# Patient Record
Sex: FEMALE | Race: WHITE
[De-identification: names, ages, dates, MRNs, and addresses within clinical notes are randomized per-mention and may not be internally consistent; named-entity substitution may affect disease eponyms.]

---

## 2018-12-07 ENCOUNTER — HOSPITAL ENCOUNTER (OUTPATIENT)
Dept: HOSPITAL 97 - OR | Age: 73
Discharge: HOME | End: 2018-12-07
Attending: SURGERY
Payer: COMMERCIAL

## 2018-12-07 DIAGNOSIS — G25.81: ICD-10-CM

## 2018-12-07 DIAGNOSIS — M19.90: ICD-10-CM

## 2018-12-07 DIAGNOSIS — G89.29: ICD-10-CM

## 2018-12-07 DIAGNOSIS — K64.8: ICD-10-CM

## 2018-12-07 DIAGNOSIS — Z79.51: ICD-10-CM

## 2018-12-07 DIAGNOSIS — Z79.1: ICD-10-CM

## 2018-12-07 DIAGNOSIS — J44.9: ICD-10-CM

## 2018-12-07 DIAGNOSIS — I10: ICD-10-CM

## 2018-12-07 DIAGNOSIS — K57.30: ICD-10-CM

## 2018-12-07 DIAGNOSIS — Z86.010: ICD-10-CM

## 2018-12-07 DIAGNOSIS — K64.5: ICD-10-CM

## 2018-12-07 DIAGNOSIS — Z12.11: Primary | ICD-10-CM

## 2018-12-07 DIAGNOSIS — Z79.82: ICD-10-CM

## 2018-12-07 DIAGNOSIS — Z79.899: ICD-10-CM

## 2018-12-07 DIAGNOSIS — M54.12: ICD-10-CM

## 2018-12-07 PROCEDURE — 0DJD8ZZ INSPECTION OF LOWER INTESTINAL TRACT, VIA NATURAL OR ARTIFICIAL OPENING ENDOSCOPIC: ICD-10-PCS

## 2018-12-07 NOTE — XMS REPORT
Continuity of Care Document

 Created on:2018



Patient:CARRIE GOLDBERG

Sex:Female

:1945

External Reference #:[id number]





Demographics







 Address  [address] [place] [address], [place] [address]

 

 Phone  [phone]

 

 Preferred Language  Unknown

 

 Marital Status  Unknown

 

 [employer] Affiliation  Unknown

 

 Race  Unknown

 

 Ethnic Group  Unknown









Author







 Organization  [facility]









Problems







 Problem  Status  Onset Date  Classification  Date  Comments  Source



         Reported    



             



             







Medications







 Medication  Details  Route  Status  Patient  Ordering  Order  Source



         Instructions  Provider  Date  



               



               



               







Allergies, Adverse Reactions, Alerts







 Substance  Category  Reaction  Severity  Reaction  Status  Date  Comments  
Source



         type    Reported    



                 



                 







Immunizations







 Immunization  Date Given  Site  Status  Last Updated  Comments  Source



             



             







Results







 Order  Results  Value  Reference  Date  Interpretation  Comments  Source



 Name      Range        



               



               







Vital Signs







 Vital Sign  Value  Date  Comments  Source



         







Encounters







 Location  Location  Encounter  Encounter  Reason  Attending  ADM  DC  Status  
Source



   Details  Type  Number  For  Provider  Date  Date    



         Visit          



                   



                   



                   

 

     Outpatient  [id number]    MIGUEL      [facility]  /[address] [place] [address]



                   



                   



                   



                   

 

     Outpatient  [id number]    [facility]      [address]



                   



                   



                   



                   







Procedures







 Procedure  Code  Date  Perfomer  Comments  Source

## 2018-12-07 NOTE — ENDO RPT
[address], [address]





COLONOSCOPY PROCEDURE REPORT     EXAM DATE: 12/07/2018



PATIENT NAME:      Patricia Mcdonald           MR #:      [id number]

YOB: 1945      VISIT #:     [id number]

ATTENDING:     Govind Michel DR     STATUS:     outpatient

ASSISTANT:      Sandra Elizabeth RN, Gloria Montelongo, and Skip Montelongo



INDICATIONS:  The patient is a 73 yr old Female here for a colonoscopy due to

colon cancer screening

PROCEDURE PERFORMED:     Screening Colonoscopy and Colonoscopy

MEDICATIONS:     Per Anesthesia.

ESTIMATED BLOOD LOSS:     None



CONSENT: The patient understands the risks and benefits of the procedure and

understands that these risks include, but are not limited to: sedation,

allergic reaction, infection, perforation and/or bleeding. Alternative means of

evaluation and treatment include, among others: physical exam, x-rays, and/or

surgical intervention. The patient elects to proceed with this endoscopic

procedure.



DESCRIPTION OF PROCEDURE:  During intra-op preparation period all mechanical 

medical equipment was checked for proper function. Hand hygiene and appropriate

measures for infection prevention was taken.  Procedure, possible

complications, alternatives including, but not limited to possibility of

bleeding, perforation, tear, infection, sepsis, need for surgery, need for

blood transfusion, were explained to the patient.  After the risks, benefits

and alternatives of the procedure were thoroughly explained, Informed consent

was verified, confirmed and timeout was successfully executed by the treatment

team.  The patient was placed in the left lateral position.   A digital rectal

exam was performed and revealed internal thrombosed hemorrhoids.  After

appropriate level of anesthesia, the scope was passed.  The EC-[id number] ([id number])

endoscope was introduced through the anus and advanced to the cecum, which was

identified by both the appendix and ileocecal valve. The quality of the prep

was fair. The instrument was then slowly withdrawn as the colon was fully

examined.  Scope withdrawal time was 8 minutes.



COLON FINDINGS: Mild diverticulosis was noted in the descending colon.  No

bleeding was noted from the diverticulosis.   Small internal hemorrhoids were

found.  Retroflexed views revealed no abnormalities. The scope was then

completely withdrawn from the patient and the procedure terminated.







ADVERSE EVENTS:      There were no complications.

IMPRESSIONS:     1.  Mild diverticulosis was noted in the descending colon

2.  Small internal hemorrhoids



RECOMMENDATIONS:     1.  yearly hemoccult starting in 4 years

2.  hemorrhoidal hygiene

3.  low fiber / diverticular diet

RECALL:     Return in 10 year(s) for Colonoscopy.



_____________________________

Govind Michel DR

eSigned:  Govind Michel DR 12/07/2018 10:00 AM





cc:



CPT CODES:

ICD9 CODES:





PATIENT NAME:  Katie Mcdonaldkenia MEDINA

MR#: [id number]

## 2024-08-14 NOTE — RAD REPORT
EXAM DESCRIPTION:  RADChest Single View8/14/2024 10:24 am

 

CLINICAL HISTORY:  sepsis

 

COMPARISON:  Chest Single View dated 7/26/2024; Chest Pa And Lat (2 Views) dated 2/21/2023; Chest Pa 
And Lat (2 Views) dated 3/29/2018; CHEST PA AND LAT 2 VIEW dated 12/11/2009

 

TECHNIQUE:   Portable AP view of the chest.

 

FINDINGS:  Developing patchy left central basilar airspace opacity. Mild hazy opacification at the ri
ght base, could reflect mild airspace opacification versus artifactual increased density due to super
imposition of soft tissues.  No pneumothorax or effusion. The cardiomediastinal contours are unremark
able.

 

IMPRESSION:  Developing patchy left basilar airspace opacity and questionable mild right basilar airs
pace opacification. Findings raise concern for pneumonia.

## 2024-08-14 NOTE — RAD REPORT
EXAM DESCRIPTION:  CT - Abdomen   Pelvis W Contrast - 8/14/2024 11:38 am

 

CLINICAL HISTORY:  ABD PAIN

 

COMPARISON:  Small Bowel Series dated 9/29/2023

 

TECHNIQUE:  Thin cut axial CT imaging of the abdomen and pelvis was performed following intravenous a
dministration of 100 mL Isovue 300. Multiplanar reformats were generated and reviewed.

 

All CT scans are performed using dose optimization technique as appropriate and may include automated
 exposure control or mA/KV adjustment according to patient size.

 

FINDINGS:  Numerous bibasilar lung nodules, the largest in the left lower lobe anteriorly measuring 1
.2 cm and in the medial right lower lobe, subpleural in location, measuring 11 mm.

 

The liver, spleen, and pancreas show no suspicious findings. Gallbladder and biliary tree are also wi
thout suspicious finding.

 

Hypoenhancement at the left lower renal pole. No subcapsular fluid collections. Bilateral ureteral st
ents are present within mildly dilated renal collecting systems. Evidence of left renal collecting sy
stem duplication with mild hydronephrosis along the lower moiety collecting system as well.

 

No dilated bowel loops or bowel wall thickening. No free air, free fluid or inflammatory stranding. N
o hernia, mass or bulky lymphadenopathy. Bilateral hip arthroplasty hardware in place which limits ev
aluation in the pelvis due to streak artifact. Soft tissue prominence and hypoattenuation in the kate
on of the lower uterine segment and cervix, not well evaluated given streak artifact. Well-circumscri
bed fundal collection measuring 5.1 x 3.1 cm, may relate to a degenerating fibroid. Bladder is modera
tely distended, with mild pericystic fat stranding near the dome. The bladder outlet region is not we
ll evaluated due to metallic streak artifact.

 

No suspicious bony findings.

 

 

IMPRESSION:  Bilateral mild hydroureteronephrosis with ureteral stents in place. 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
 of the left renal collecting system, with hydronephrosis of the non stented lower moiety collecting 
system as well.

 

Region of hypoenhancement at the left lower renal pole, which could relate to collecting system obstr
uction versus ongoing pyelonephritis. Please correlate clinically. Questionable pericystic mild fat s
tranding as well, please correlate clinically for infectious/inflammatory cystitis.

 

Region of soft tissue prominence and hypoattenuation in the lower uterine segment and cervix. This re
gion is not well evaluated given extensive metallic streak artifact resulting from bilateral hip arth
roplasty hardware. Additional evaluation by targeted pelvic ultrasound may be helpful.

 

Numerous bilateral lower lung nodules, up to 1.2 cm in size. In the absence of prior comparisons, a f
ollow-up CT chest in 3-6 months would be recommended to ensure stability, with an additional follow-u
p CT in 18-24 months especially if the patient is high risk for lung cancer, per [employer] clare young.

 

Other incidental findings as above.

## 2024-08-14 NOTE — EDPHYS
Patient is a 89-year-old female with past medical history of hypertension hyperlipidemia hypothyroidism hearing loss asthma not on any blood thinners brought in by niece for generalized weakness and decline for about 1 week.  Family states she lives alone and is usually very independent but for about 2 weeks have noticed a decline.  Patient's neighbor has been helping out with feeding.  Patient is not eating on her own not speaking as much unable to walk due to weakness.  No known trauma or falls family states patient was moaning today. Physician Documentation                                                                           

 [facility]                                                                 

Name: Patricia Mcdonald                                                                           

Age: 78 yrs                                                                                       

Sex: Female                                                                                       

: 1945                                                                                   

MRN: [id number]                                                                                   

Arrival Date: 2024                                                                          

Time: 09:54                                                                                       

Account#: [id number]                                                                            

Bed 15                                                                                            

Private MD:                                                                                       

ED Physician Tomer Herrera                                                                         

HPI:                                                                                              

                                                                                             

11:18 This 78 yrs old Female presents to ER via Ambulatory with complaints of Urinary         sp3 

      Incontinence, Abdominal Pain.                                                               

11:18 78-year-old female with history of kidney disease, hypertension and recent  cancer    sp3 

      that she is being evaluated for regarding chemo and radiation. Patient was at routine       

      appointment at cardiologist office today where she reported tachycardia, urinary            

      frequency and abdominal pain. Dr. Wilkinson evaluated patient and sent her to the ED for       

      further evaluation and probable urosepsis. Patient denies fever, chest pain, shortness      

      of breath, back pain, syncope, near syncope, rash, or any other signs or symptoms on        

      ROS at this time. She does endorse urinary frequency and mild dysuria..                     

                                                                                                  

Historical:                                                                                       

- Allergies:                                                                                      

10:04 No Known Allergies;                                                                     dd2 

- PMHx:                                                                                           

10:04 Kidney disease; Hypertensive disorder;                                                  dd2 

- PSHx:                                                                                           

10:04 Hemorrhoidectomy;                                                                       dd2 

                                                                                                  

- Immunization history:: Adult Immunizations unknown.                                             

- Infectious Disease History:: Denies.                                                            

- Social history:: Smoking status: Patient denies any tobacco usage or history of.                

                                                                                                  

                                                                                                  

ROS:                                                                                              

11:19 Constitutional: Negative for fever, chills, and weight loss, Eyes: Negative for injury, sp3 

      pain, redness, and discharge, Neck: Negative for injury, pain, and swelling,                

      Respiratory: Negative for shortness of breath, cough, wheezing, and pleuritic chest         

      pain, Back: Negative for injury and pain, MS/Extremity: Negative for injury and             

      deformity, Skin: Negative for injury, rash, and discoloration, Neuro: Negative for          

      headache, weakness, numbness, tingling, and seizure, Psych: Negative for depression,        

      anxiety, suicide ideation, homicidal ideation, and hallucinations, Allergy/Immunology:      

      Negative for hives, rash, and allergies, Endocrine: Negative for neck swelling,             

      polydipsia, polyuria, polyphagia, and marked weight changes,                                

11:19 All other systems are negative,                                                             

                                                                                                  

Exam:                                                                                             

11:19 Constitutional:  This is a well developed, well nourished patient who is awake, alert,  sp3 

      and in no acute distress. Head/Face:  Normocephalic, atraumatic. Eyes:  Pupils equal        

      round and reactive to light, extra-ocular motions intact.  Lids and lashes normal.          

      Conjunctiva and sclera are non-icteric and not injected.  Cornea within normal limits.      

      Periorbital areas with no swelling, redness, or edema. ENT:  Nares patent. No nasal         

      discharge, no septal abnormalities noted.  External auditory canals are clear.              

      Oropharynx with no redness, swelling, or masses, exudates, or evidence of obstruction,      

      uvula midline.  Mucous membranes moist. Neck:  Trachea midline, no thyromegaly or           

      masses palpated, and no cervical lymphadenopathy.  Supple, full range of motion without     

      nuchal rigidity, or vertebral point tenderness.  No Meningismus. Chest/axilla:  Normal      

      chest wall appearance and motion.  Nontender with no deformity.  No lesions are             

      appreciated. Respiratory:  Lungs have equal breath sounds bilaterally, clear to             

      auscultation and percussion.  No rales, rhonchi or wheezes noted.  No increased work of     

      breathing, no retractions or nasal flaring. Back:  No spinal tenderness.  No                

      costovertebral tenderness.  Full range of motion. Skin:  Warm, dry with normal turgor.      

      Normal color with no rashes, no lesions, and no evidence of cellulitis. MS/ Extremity:      

      Pulses equal, no cyanosis.  Neurovascular intact.  Full, normal range of motion. Neuro:     

       Awake and alert, GCS 15, oriented to person, place, time, and situation.  Cranial          

      nerves II-XII grossly intact.  Motor strength 5/5 in all extremities.  Sensory grossly      

      intact.  Cerebellar exam normal.  Normal gait. Psych:  Awake, alert, with orientation       

      to person, place and time.  Behavior, mood, and affect are within normal limits.            

11:19 Cardiovascular: Patient tachycardic initially in the 130s now responding to IV fluids       

      to 110s.  Lower abdominal pain noted without peritoneal signs, rebound or guarding.,        

                                                                                                  

Vital Signs:                                                                                      

10:02  / 80; Pulse 127; Resp 16; Temp 97.5; Pulse Ox 94% ; Weight 66.22 kg; Height 5    dd2 

      ft. 0 in. ;                                                                                 

10:36  / 87; Pulse 116; Resp 22 S; Pulse Ox 95% on R/A;                                 kc6 

11:24  / 80; Pulse 104; Resp 22 S; Pulse Ox 95% on R/A;                                 kc6 

11:57 Pulse Ox 89% on R/A;                                                                    kc6 

11:57  / 92; Pulse 98; Resp 23 S; Pulse Ox 96% on 2.5 lpm NC;                           kc6 

12:21  / 93; Pulse 107; Resp 24 S; Temp 98.5(O); Pulse Ox 96% on 2.5 lpm NC;            kc6 

13:10  / 96; Pulse 113; Resp 26; Pulse Ox 98% on 2 lpm NC;                              me1 

14:00  / 103; Pulse 113; Resp 26; Pulse Ox 96% on 2 lpm NC;                             me1 

14:30  / 101; Pulse 118; Resp 25; Temp 103.2(O); Pulse Ox 94% on 2 lpm NC;              me1 

14:47 Temp 103.2(O);                                                                          me1 

10:02 Body Mass Index 28.51 (66.22 kg, 152.4 cm)                                              dd2 

                                                                                                  

MDM:                                                                                              

10:08 Patient medically screened.                                                             sp3 

11:20 Data reviewed: vital signs, nurses notes, lab test result(s), EKG, radiologic studies.  sp3 

      ED course: 78-year-old female with PMH above and recent cancer diagnosis now with         

      symptoms and tachycardia. Differential diagnosis includes UTI/pyelonephritis spectrum,      

      sepsis, other GI pathology, other infection, viral illness, among others. Workup to         

      include laboratory values, UA, lactate and general supportive care including IV fluids,     

      antibiotics as needed..                                                                     

11:22 ED course: Heart rate now 110 down from 140 on arrival. This is after 30 mL/kg IV fluid sp3 

      bolus. Patient feels improved and now has better urine output. Cefepime ordered.            

      Lactate pending but we will admit patient at this time..                                    

12:11 ED course: Bilateral hydronephrosis in place despite stents. Patient has urosepsis as   sp3 

      well as pneumonia. Lactate of 2.0. Heart rate did respond to IV fluids. Cefepime on         

      board. At this point given no urology here, we will transfer to [facility] where her stents       

      were placed. Patient is requesting [facility] so that will be communicated to the       

      transfer center..                                                                           

13:34 ED course: Patient accepted by hospitalist at [facility]..                                                                                                                       

10:09 Order name: Blood Culture Adult (2)                                                     3 

                                                                                             

10:09 Order name: CBC with Diff; Complete Time: 11:                                         [facility] [place] 

                                                                                             

10:09 Order name: CMP; Complete Time: 11:                                                   [facility] [place] 

                                                                                             

10:09 Order name: Lactate w/ 2H reflex if indic.; Complete Time: 12:02                        [facility] [place] 

                                                                                             

10:09 Order name: Protime (+inr); Complete Time: 11:21                                        [facility] [place] 

                                                                                             

10:09 Order name: Ptt, Activated; Complete Time: 11:21                                        [facility] [place] 

                                                                                             

10:09 Order name: Urinalysis w/ reflexes; Complete Time: :                                [facility] [place] 

                                                                                             

10:09 Order name: ABG: VBG; Complete Time: 11:                                              [facility] [place] 

                                                                                             

11:11 Order name: Urine Culture                                                               EDMS

                                                                                             

10:09 Order name: Chest Single View XRAY; Complete Time: 12:02                                [facility] [place] 

                                                                                             

11:24 Order name: CT Abd/Pelvis - IV Contrast Only; Complete Time: 12:04                      [facility] [place] 

                                                                                             

10:09 Order name: Accucheck; Complete Time: 10:35                                             [facility] [place] 

                                                                                             

10:09 Order name: Cardiac monitoring; Complete Time: 10:30                                    [facility] [place] 

                                                                                             

10:09 Order name: EKG - Nurse/Tech; Complete Time: 10:30                                      3 

08/14                                                                                             

10:09 Order name: IV Saline Lock - Large Bore; Complete Time: 10:35                           3 

08/14                                                                                             

10:09 Order name: Labs collected and sent; Complete Time: 10:34                               [facility] [place] 

                                                                                             

10:09 Order name: O2 Per Protocol; Complete Time: 10:                                       3 

08/14                                                                                             

10:09 Order name: O2 Sat Monitoring; Complete Time: 10:                                     [facility] [place] 

                                                                                             

10:09 Order name: Vital Signs; Complete Time: 10:                                           [facility] [place] 

                                                                                             

10:39 Order name: Labs - recollect needed: recollect lactate, blood culture and red top for   bd  

      bc; Complete Time: 10:59                                                                    

                                                                                                  

Administered Medications:                                                                         

10:35 Drug: NS 0.9% IV (30 ml/kg) 30 ml/kg IV at bolus once; Sepsis Protocol Route: IV; Rate: kc6 

      bolus; Site: right antecubital;                                                             

13:21 Follow up: Response: No adverse reaction; IV Status: Completed infusion                 me1 

11:56 Drug: Cefepime IVPB 2 grams IVPB at 200 ml/hr once over 30 mins; (mix in  mL)     kc6 

      Route: IVPB; Rate: 200 ml/hr; Infused Over: 30 mins; Site: right antecubital;               

12:43 Follow up: Response: No adverse reaction; IV Status: Completed infusion; IV Intake:     kc6 

      100ml                                                                                       

12:43 Drug: morphine IVP or IV 4 mg IVP once over 4 mins Route: IVP; Infused Over: 4 mins;    kc6 

      Site: right antecubital;                                                                    

13:21 Follow up: Response: No adverse reaction; Pain is decreased                             me1 

12:43 Drug: Ondansetron IVP 4 mg IVP once; over 2 minutes Route: IVP; Site: right antecubital;kc6 

13:21 Follow up: Response: No adverse reaction; Nausea is decreased                           me1 

14:53 Drug: Acetaminophen PO 1000 mg PO once Route: PO;                                       me1 

15:09 Follow up: Response: No adverse reaction                                                me1 

                                                                                                  

                                                                                                  

Disposition Summary:                                                                              

24 12:11                                                                                    

Transfer Ordered                                                                                  

 Notes:       Transfer Location: [facility]                                                        
  sp3

      Reason: Higher level of care                                                            sp3 

      Condition: Stable                                                                       sp3 

      Problem: an acute exacerbation                                                          sp3 

      Symptoms: have worsened                                                                 sp3 

      Accepting Physician: [facility] COLTON(24 15:10)                                           me1 

      Diagnosis                                                                                   

        - Urosepsis, pyelonephritis, ureteral stents, hydronephrosis, pneumonia               sp3 

      Forms:                                                                                      

        - Medication Reconciliation Form                                                      sp3 

        - SBAR form                                                                           sp3 

Critical care time excluding procedures:                                                          

12:16 Critical care time: Bedside Care: 15 minutes, Consultation: 10 minutes, Family          sp3 

      Intervention: 10 minutes. Total time: 35 minutes                                            

                                                                                                  

Signatures:                                                                                       

Dispatcher [employer]                           EDMS                                                 

Audelia Hill Setul, MD MD   sp3                                                  

Shiloh Gonzalez RN                   RN   kc6                                                  

Francheska Spencer RN                  RN   me1                                                  

EDER COBIAN RN                        RN   dd2                                                  

                                                                                                  

Corrections: (The following items were deleted from the chart)                                    

10:09 10:09 Chest Single View+RAD.RAD.BRZ ordered. EDMS                                       EDMS

10:09 10:09 Arterial Blood Gas+RC.LAB.BRZ ordered. EDMS                                       EDMS

15:10 12:11 [facility] TBD sp3                                                                      me1 

                                                                                                  

**************************************************************************************************

## 2024-08-14 NOTE — ER
Nurse's Notes                                                                                     

 [facility]                                                                 

Name: Patricia Mcdonald                                                                           

Age: 78 yrs                                                                                       

Sex: Female                                                                                       

: 1945                                                                                   

MRN: [id number]                                                                                   

Arrival Date: 2024                                                                          

Time: 09:54                                                                                       

Account#: [id number]                                                                            

Bed 15                                                                                            

Private MD:                                                                                       

Diagnosis: Urosepsis, pyelonephritis, ureteral stents, hydronephrosis, pneumonia                  

                                                                                                  

Presentation:                                                                                     

                                                                                             

10:02 Chief complaint: Patient states: Pt states urinary incontinence last night. Called her  dd2 

      Dr. Wilkinson and was advised to come to the ER. Pt c/o lower abdominal pain also.             

      Coronavirus screen: At this time, the client does not indicate any symptoms associated      

      with coronavirus-19. Ebola Screen: No symptoms or risks identified at this time.            

      Initial Sepsis Screen: Does the patient meet any 2 criteria? No. Patient's initial          

      sepsis screen is negative. Does the patient have a suspected source of infection? No.       

      Patient's initial sepsis screen is negative. Risk Assessment: Do you want to hurt           

      yourself or someone else? Patient reports no desire to harm self or others. Onset of        

      symptoms was 2024.                                                               

10:02 Method Of Arrival: Ambulatory                                                           dd2 

10:02 Acuity: BARRY 3                                                                           dd2 

                                                                                                  

Triage Assessment:                                                                                

10:04 General: Appears uncomfortable, ill, Behavior is calm, cooperative. Pain: Complains of  dd2 

      pain in left low back and right low back and lower abdomen.                                 

                                                                                                  

Historical:                                                                                       

- Allergies:                                                                                      

10:04 No Known Allergies;                                                                     dd2 

- PMHx:                                                                                           

10:04 Kidney disease; Hypertensive disorder;                                                  dd2 

- PSHx:                                                                                           

10:04 Hemorrhoidectomy;                                                                       dd2 

                                                                                                  

- Immunization history:: Adult Immunizations unknown.                                             

- Infectious Disease History:: Denies.                                                            

- Social history:: Smoking status: Patient denies any tobacco usage or history of.                

                                                                                                  

                                                                                                  

Screening:                                                                                        

10:35 [facility] ED Fall Risk Assessment (Adult) History of falling in the last 3 months,       kc6 

      including since admission No falls in past 3 months (0 pts) Confusion or Disorientation     

      No (0 pts) Intoxicated or Sedated No (0 pts) Impaired Gait No (0 pts) Mobility Assist       

      Device Used No (0 pt) Altered Elimination No (0 pt) Score/Fall Risk Level 0 - 2 = Low       

      Risk. Abuse screen: Denies threats or abuse. Denies injuries from another. Nutritional      

      screening: No deficits noted. Tuberculosis screening: No symptoms or risk factors           

      identified.                                                                                 

                                                                                                  

Assessment:                                                                                       

10:36 General: Appears in no apparent distress. comfortable, well groomed, well developed,    kc6 

      Behavior is calm, cooperative, appropriate for age. Pain: Complains of pain in left         

      lower quadrant. Neuro: Level of Consciousness is awake, alert, obeys commands, Oriented     

      to person, place, time, situation, Appropriate for age. Cardiovascular: Denies chest        

      pain, shortness of breath, Heart tones S1 S2 present Capillary refill < 3 seconds           

      Rhythm is sinus tachycardia. Respiratory: Reports cough that is productive, Airway is       

      patent Trachea midline Respiratory effort is even, unlabored, Respiratory pattern is        

      regular, symmetrical, Sputum is yellow Breath sounds with crackles bilaterally. GI: No      

      signs and/or symptoms were reported involving the gastrointestinal system. : Reports      

      bedwetting, cramping, in left lower quadrant(s) incontinence, since last night urgency,     

      urinary frequency. EENT: No signs and/or symptoms were reported regarding the EENT          

      system. Derm: No signs and/or symptoms reported regarding the dermatologic system. Skin     

      is intact, is healthy with good turgor, Skin is pink, warm \T\ dry. Musculoskeletal: No     

      signs and/or symptoms reported regarding the musculoskeletal system. Circulation,           

      motion, and sensation intact. Capillary refill < 3 seconds, Range of motion: intact in      

      all extremities.                                                                            

11:36 Reassessment: Patient appears in no apparent distress at this time. No changes from     kc6 

      previously documented assessment. Patient and/or family updated on plan of care and         

      expected duration. Pain level reassessed. Patient is alert, oriented x 3, equal             

      unlabored respirations, skin warm/dry/pink.                                                 

12:43 Reassessment: Patient appears in no apparent distress at this time. No changes from     kc6 

      previously documented assessment. Patient and/or family updated on plan of care and         

      expected duration. Pain level reassessed. Patient is alert, oriented x 3, equal             

      unlabored respirations, skin warm/dry/pink.                                                 

                                                                                                  

Vital Signs:                                                                                      

10:02  / 80; Pulse 127; Resp 16; Temp 97.5; Pulse Ox 94% ; Weight 66.22 kg; Height 5    dd2 

      ft. 0 in. ;                                                                                 

10:36  / 87; Pulse 116; Resp 22 S; Pulse Ox 95% on R/A;                                 kc6 

11:24  / 80; Pulse 104; Resp 22 S; Pulse Ox 95% on R/A;                                 kc6 

11:57 Pulse Ox 89% on R/A;                                                                    kc6 

11:57  / 92; Pulse 98; Resp 23 S; Pulse Ox 96% on 2.5 lpm NC;                           kc6 

12:21  / 93; Pulse 107; Resp 24 S; Temp 98.5(O); Pulse Ox 96% on 2.5 lpm NC;            kc6 

13:10  / 96; Pulse 113; Resp 26; Pulse Ox 98% on 2 lpm NC;                              me1 

14:00  / 103; Pulse 113; Resp 26; Pulse Ox 96% on 2 lpm NC;                             me1 

14:30  / 101; Pulse 118; Resp 25; Temp 103.2(O); Pulse Ox 94% on 2 lpm NC;              me1 

14:47 Temp 103.2(O);                                                                          me1 

10:02 Body Mass Index 28.51 (66.22 kg, 152.4 cm)                                              dd2 

                                                                                                  

ED Course:                                                                                        

10:00 Patient arrived in ED.                                                                  ra3 

10:00 Tomer Herrera MD is Attending Physician.                                                sp3 

10:04 Triage completed.                                                                       dd2 

10:04 Arm band placed on left wrist. Patient placed in an exam room, on a stretcher, on pulse dd2 

      oximetry, Patient notified of wait time.                                                    

10:11 Shiloh Gonzalez, RN is Primary Nurse.                                                 kc6 

10:25 Chest Single View XRAY In Process Unspecified.                                          EDMS

10:33 Initial lab(s) drawn, by me, sent to lab. First set of blood cultures drawn by me,      oh1 

      Second set of blood cultures drawn by me.                                                   

10:34 Inserted saline lock: 20 gauge in right antecubital area, using aseptic technique.      oh1 

10:35 Patient has correct armband on for positive identification. Bed in low position. Call   [facility] 

      light in reach. Side rails up X 1. Adult w/ patient. Cardiac monitor on. Pulse ox on.       

      NIBP on. Door closed. Noise minimized. Lights dimmed. Warm blanket given. Pillow given.     

11:07 Lab(s) recollected, by me, sent to lab.                                                 oh1 

11:24 Warm blanket given.                                                                     kc6 

11:39 CT Abd/Pelvis - IV Contrast Only In Process Unspecified.                                EDMS

12:14 initiated transfer to [facility].                                                  bd  

12:30 Assisted to bedside commode. Repositioned patient.                                      kc6 

12:35 pt denied due to no beds in the [facility] system per cesar.                                bd  

12:40 Patient requests pain medication.                                                       kc6 

12:43 Assisted to bedside commode. Repositioned patient. Cleaned of incontinence. Linen       kc6 

      changed.                                                                                    

12:45 initiated transfer to [facility].                                       bd  

13:49 pt accepted in transfer to [facility] by dr Oconnor admin approval given by       nando Wu.                                                                           

14:04 Provided Education on: POC. Verbalized understanding. .                                 me1 

14:22 contacted [facility], ambulance still in [place], will be at least 45 min,per Flory.         bd  

14:25 pt will be transported by [facility] em[facility].                                               bd  

15:09 No provider procedures requiring assistance completed. Patient transferred, IV remains  me1 

      in place.                                                                                   

                                                                                                  

Administered Medications:                                                                         

10:35 Drug: NS 0.9% IV (30 ml/kg) 30 ml/kg IV at bolus once; Sepsis Protocol Route: IV; Rate: kc6 

      bolus; Site: right antecubital;                                                             

13:21 Follow up: Response: No adverse reaction; IV Status: Completed infusion                 me1 

11:56 Drug: Cefepime IVPB 2 grams IVPB at 200 ml/hr once over 30 mins; (mix in  mL)     kc6 

      Route: IVPB; Rate: 200 ml/hr; Infused Over: 30 mins; Site: right antecubital;               

12:43 Follow up: Response: No adverse reaction; IV Status: Completed infusion; IV Intake:     kc6 

      100ml                                                                                       

12:43 Drug: morphine IVP or IV 4 mg IVP once over 4 mins Route: IVP; Infused Over: 4 mins;    kc6 

      Site: right antecubital;                                                                    

13:21 Follow up: Response: No adverse reaction; Pain is decreased                             me1 

12:43 Drug: Ondansetron IVP 4 mg IVP once; over 2 minutes Route: IVP; Site: right antecubital;kc6 

13:21 Follow up: Response: No adverse reaction; Nausea is decreased                           me1 

14:53 Drug: Acetaminophen PO 1000 mg PO once Route: PO;                                       me1 

15:09 Follow up: Response: No adverse reaction                                                me1 

                                                                                                  

                                                                                                  

Medication:                                                                                       

14:04 VIS not applicable for this client.                                                     me1 

                                                                                                  

Intake:                                                                                           

12:43 IV: 100ml; Total: 100ml.                                                                kc6 

                                                                                                  

Outcome:                                                                                          

12:11 ER care complete, transfer ordered by MD. myers 

15:09 Transferred by ground EMS to [facility], Transfer form completed.    me1 

      X-rays sent w/ patient. Note:  Report given to IMELDA Palomino                                   

15:09 Condition: stable                                                                           

15:09 Instructed on the need for transfer,                                                        

15:10 Patient left the ED.                                                                    me1 

                                                                                                  

Signatures:                                                                                       

Dispatcher [employer]                           EDAudelia Tran Setul, MD MD   sp3                                                  

Shiloh Gonzalez RN                   RN   kc6                                                  

Francheska pSencer RN                  RN   me1                                                  

Karol Nelson ra3                                                  

EDER COBIAN RN                        RN   dd2                                                  

Diana Segura                             oh1                                                  

                                                                                                  

Corrections: (The following items were deleted from the chart)                                    

11:24 10:35 Door closed. Noise minimized. Lights dimmed. Pillow given. kc6                    kc6 

                                                                                                  

**************************************************************************************************

## 2024-12-10 NOTE — EDPHYS
Physician Documentation                                                                           

 [facility]                                                                 

Name: Patricia Mcdnoald                                                                           

Age: 79 yrs                                                                                       

Sex: Female                                                                                       

: 1945                                                                                   

MRN: [id number]                                                                                   

Arrival Date: 12/10/2024                                                                          

Time: 16:22                                                                                       

Account#: [id number]                                                                            

Bed 14                                                                                            

Private MD:                                                                                       

ED Physician Rufino Marmolejo                                                                    

HPI:                                                                                              

12/10                                                                                             

16:46 This 79 yrs old Female presents to ER via Wheelchair with complaints of Breathing       ms3 

      Difficulty - Low oxygen.                                                                    

17:32 This 79 yrs old Female presents to ER via Wheelchair with complaints of Breathing       ms3 

      Difficulty - Low oxygen.                                                                    

17:32 Ms. Patricia Mcdonald is an 79-year-old female with a past medical history significant  ms3 

      for kidney disease, chronic obstructive pulmonary disease (COPD), pulmonary embolisms,      

      and cancer. She was sent from the [facility] for oxygen management. Recently she was     

      discharged from [facility] with home O2. Today at the [facility] center patent's oxygen      

      saturation was found to be 74% on room air. She was started on 2-3 liters of oxygen.        

      Currently, her oxygen saturation is 88% on room air. .                                      

                                                                                             

00:04 Patient is 70-year-old female past medical history CKD, COPD, bilateral PEs, currently  sp4 

      off anticoagulation history of bilateral ureteral stents history of vaginal cervical        

      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 CT PET scan 2024 revealed nodular bulky soft        

      tissue thickening centered on the upper vagina cervix extending to the lower uterine        

      segment concerning for primary malignancy with extension. Enlarged left iliac chain         

      suggestive of metastatic adenopathy, nodular thickening along the left lateral wall of      

      the bladder may relate to metastatic involvement of urinary bladder. Multiple bilateral     

      pulmonary nodules concerning for metastatic disease. Patient was managed for cervical       

      cancer by Dr. Jason Seth at [facility] who put in diagnosis of squamous cell carcinoma.       

      High risk HPV. [facility] cervical cancer. Patient also has history of complete              

      occlusive thrombus right middle lobe segmental arterial branch. She was started on          

      anticoagulation but stopped secondary to GI bleeding in 2024. CT abdomen pelvis        

      2020 for persistent moderate bilateral hydronephrosis hydroureter despite good      

      position of double J pigtail stents. 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       

      Patient was also diagnosed with cervical cancer after biopsy of cervical mass in            

      2024. Patient medications include losartan, carvedilol, baclofen, meloxicam,       

      gabapentin, vitamin D3, Trelegy fluticasone vilanterol, pantoprazole, trazodone,            

      ferrous sulfate, Decadron, dexamethasone, Zofran, Norco 10. History of bilateral hip        

      replacement bilateral ureteral stent spinal surgery. Patient received chemotherapy          

      2024 with paclitaxel and carboplatin. Also pembrolizumab Keytruda. GYN Dr. José Miguel lizarraga will add care of brolucizumab 3 times weekly in addition to carboplatin and     

      paclitaxel. Patient has right chest wall chemotherapy port. Patient received                

      carboplatin and paclitaxel today. Patient noticed that she was hypoxemic at the cancer      

      clinic today and she was sent here for oxygen saturation with lowest measured at 74% on     

      room air..                                                                                  

                                                                                                  

Historical:                                                                                       

- Allergies:                                                                                      

12/10                                                                                             

16:37 No Known Allergies;                                                                     cm10

- PMHx:                                                                                           

16:37 Hypertensive disorder; kidney disease; VAGINAL CANCER; COPD; PULMONARY EMBOLISM;        cm10

- PSHx:                                                                                           

16:37 Hemorrhoidectomy;                                                                       cm10

                                                                                                  

- Immunization history:: Adult Immunizations up to date.                                          

- Infectious Disease History:: Denies.                                                            

- Social history:: Smoking status: Patient denies any tobacco usage or history of.                

                                                                                                  

                                                                                                  

ROS:                                                                                              

17:32 Constitutional: Negative for fever, and chills. Cardiovascular: Negative for chest      ms3 

      pain, and palpitations. Respiratory: Negative for shortness of breath, cough, wheezing,     

      and pleuritic chest pain, Abdomen/GI: Negative for abdominal pain, nausea, vomiting,        

      diarrhea, and constipation, MS/Extremity: Negative for injury and deformity, Skin:          

      Negative for injury, rash, and discoloration,                                               

                                                                                                  

Exam:                                                                                             

17:32 Constitutional:  This is a well developed, well nourished patient who is awake, alert,  ms3 

      and in no acute distress. Chest/axilla:  Normal chest wall appearance and motion.           

      Nontender with no deformity.   Cardiovascular:  Regular rate and rhythm with a normal       

      S1 and S2.  No gallops, murmurs, or rubs.  Normal PMI, no JVD.  No pulse deficits.          

      Respiratory:  Lungs have equal breath sounds bilaterally, clear to auscultation and         

      percussion.  No rales, rhonchi or wheezes noted.  No increased work of breathing, no        

      retractions or nasal flaring. Abdomen/GI:  Soft, non-tender, with normal bowel sounds.      

      No distension or tympany.  No guarding or rebound.  No evidence of tenderness               

      throughout. Skin:  Warm, dry with normal turgor.  Normal color with no rashes, no           

      lesions, and no evidence of cellulitis. MS/ Extremity:  Pulses equal, no cyanosis.          

      Neurovascular intact.  Full, normal range of motion.                                        

                                                                                                  

Vital Signs:                                                                                      

16:39  / 84; Pulse 86; Resp 17; Temp 98(TE); Pulse Ox 88% on R/A; Pain 0/10;            cm10

19:36  / 79; Pulse 61; Resp 16; Temp 97.3; Pulse Ox 100% 4 lpm ;                        go2 

21:36  / 61; Pulse 57; Resp 16; Temp 99; Pulse Ox 100% ;                                go2 

1211                                                                                             

00:00  / 63; Pulse 58; Resp 18; Pulse Ox 98% on 2 lpm NC;                               kj2 

01:16  / 88; Pulse 88; Resp 18; Pulse Ox 98% ;                                          ty  

12/10                                                                                             

16:39 Pain Scale: Adult                                                                       cm10

                                                                                                  

MDM:                                                                                              

12/10                                                                                             

16:46 Medical Screening Exam initiated                                                        ms3 

17:32 Differential diagnosis: Anemia pulmonary edema, Pulmonary Embolism.                     ms3 

18:51 Data reviewed: vital signs, nurses notes, EMS record, lab test result(s), radiologic    sp3 

      studies. ED course: Patient signed out to me by a.m. physician. Patient is a                

      79-year-old female presents from the cancer center for hypoxia for workup. Patient has      

      had recent pulmonary embolism stemming from her primary cancer diagnosis of                 

      vulvovaginal cancer. She was recently admitted at [facility] for this. Currently she has 74%      

      on room air oxygen status. Vital signs are otherwise normal. Full workup pending            

      including labs, CT PE protocol. Patient will likely need admission once critical            

      illness is ruled out..                                                                      

22:36 ED course: EXAMINATION: ONE VIEW CHEST XR CLINICAL INDICATION: hypoxia TECHNIQUE:       sp4 

      Frontal chest projection is submitted. Examination is limited by patient positioning        

      and technique. COMPARISON: 2024 FINDINGS: The lungs are well inflated and clear.      

      The heart is upper limit of normal in size. No displaced fractures identified. Right        

      port catheter tip in SVC. IMPRESSION: No acute intrathoracic abnormalities. . ED            

      course: EXAM: CT CHEST, ABDOMEN AND PELVIS WITHOUT CONTRAST CLINICAL INDICATION:            

      urinary stents TECHNIQUE: CT chest, abdomen and pelvis was performed without contrast,      

      as per department protocol. Axial, sagittal and coronal reconstructions were obtained.      

      One or more of the following dose reduction techniques were used: Automated exposure        

      control, adjustment of the mA and/or kV according to patient size, and/or iterative         

      reconstruction. Unless otherwise specified, incidental findings do not require              

      dedicated imaging follow-up. Examination is limited by the lack of intravenous contrast     

      material. COMPARISON: 10/24/2024, 2024 FINDINGS: LUNGS: Multiple varying size          

      pulmonary nodules are present throughout both lungs. This is most compatible with           

      metastatic disease and appears similar to PET/CT dated 10/24/2024. PLEURA: No pleural       

      effusion. No pneumothorax. MEDIASTINUM AND LYMPH NODES: No mediastinal mass or fluid        

      collection. Normal size mediastinal, hilar, and axillary lymph nodes. OSSEOUS               

      STRUCTURES AND CHEST WALL: Intact. LIVER: A poorly defined 27 mm lesion in the right        

      lobe of the liver and similar 20 mm lesion in the left lower liver compatible with          

      liver metastatic disease. Additional smaller similar lesions are seen right lobe            

      infiltrate. Grossly unremarkable gallbladder. PANCREAS: No mass, ductal dilation, or        

      greg-pancreatic fluid. SPLEEN: Normal size. No focal lesion. ADRENALS: Normal; no mass.     

      KIDNEYS: Bilateral double-J stents are in place with moderate right-sided                   

      hydronephrosis and hydroureter present. Mild left-sided hydronephrosis also present.        

      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        

      hydronephrosis involving the inferior duplicated moiety as well. URINARYBLADDER: Not        

      well assessed due to streak artifact from hardware. GASTROINTESTINAL TRACT: No bowel        

      obstruction, free air, significant free fluid or abscess. APPENDIX: Normal appendix.        

      LYMPH NODES: Mild lymphadenopathy seen left iliac chain and left pelvic sidewall            

      MUSCULOSKELETAL: No acute or suspicious osseous abnormality. OTHER: IMPRESSION:             

      Bilateral double-J stents are in place as detailed with mild to moderate persistent         

      hydronephrosis bilaterally. Urinary bladder assessment is limited due to streak             

      artifact. Metastatic disease noted in the lungs and liver. Left iliac and pelvic            

      sidewall lymphadenopathy also present. Reported By: Sudeep Alas Electronically Signed:       

      12/10/2024 9:22 PM.                                                                         

                                                                                             

00:16 ED course: Patient discussed with Dr. Vitale and accepted at [facility] .       sp4 

00:34 ED course: Sepsis reevaluation complete. 30 mL/kg fluid bolus is not indicated          sp4 

      secondary to history of congestive heart failure and problem of volume overload..           

                                                                                                  

12/10                                                                                             

16:47 Order name: Basic Metabolic Panel; Complete Time: 20:15                                 ms3 

12/10                                                                                             

16:47 Order name: CBC with Diff; Complete Time: 22:04                                         ms3 

12/10                                                                                             

16:47 Order name: Magnesium; Complete Time: 20:15                                             ms3 

12/10                                                                                             

16:47 Order name: NT PRO-BNP; Complete Time: 20:15                                            ms3 

12/10                                                                                             

16:47 Order name: Troponin HS; Complete Time: 20:15                                           ms3 

12/10                                                                                             

20:48 Order name: Urinalysis W/Microscopic; Complete Time: 23:09                              sp4 

12/10                                                                                             

20:49 Order name: CBC Smear Scan; Complete Time: 22:04                                        EDMS

12/10                                                                                             

21:43 Order name: Blood Culture Adult (2)                                                     sp4 

12/10                                                                                             

21:43 Order name: Lactate w/ 2H reflex if indic.; Complete Time: 23:09                        sp4 

12/10                                                                                             

21:44 Order name: CRP; Complete Time: 23:09                                                   sp4 

12/10                                                                                             

22:52 Order name: Urine Culture                                                               EDMS

12/10                                                                                             

16:47 Order name: XRAY Chest (1 view); Complete Time: 19:22                                   ms3 

12/10                                                                                             

20:48 Order name: CT Chest Abdomen Pelvis W/O Contrast; Complete Time: 22:04                  sp4 

12/10                                                                                             

16:47 Order name: Cardiac monitoring; Complete Time: 19:33                                    ms3 

12/10                                                                                             

16:47 Order name: EKG - Nurse/Tech; Complete Time: 19:33                                      ms3 

12/10                                                                                             

16:47 Order name: IV Saline Lock; Complete Time: 19:36                                        ms3 

12/10                                                                                             

16:47 Order name: Labs collected and sent; Complete Time: 19:33                               ms3 

12/10                                                                                             

16:47 Order name: O2 Per Protocol; Complete Time: 19:14                                       ms3 

12/10                                                                                             

16:47 Order name: O2 Sat Monitoring; Complete Time: 19:14                                     ms3 

12/10                                                                                             

20:47 Order name: Noah                                                                       sp4 

                                                                                                  

Administered Medications:                                                                         

12/10                                                                                             

22:47 Drug: Cefepime IVPB 1 grams IVPB at 200 ml/hr once over 30 mins; (mix in  mL)     go2 

      Route: IVPB; Rate: 200 ml/hr; Infused Over: 30 mins; Site: right antecubital;               

22:47 Follow up: IV Status: Completed infusion                                                go2 

22:47 Drug: vancoMYCIN IVPB 1 grams IVPB once over 2 hrs Route: IVPB; Infused Over: 2 hrs;    go2 

      Site: right antecubital;                                                                    

                                                                                             

01:09 Follow up: IV Status: Completed infusion; IV Intake: 250ml                              kj2 

01:00 Drug: Norco PO 10 mg-325 mg 1 tabs PO once Route: PO;                                   kj2 

01:20 Follow up: Response: No adverse reaction                                                kj2 

01:08 Drug: metroNIDAZOLE IVPB 500 mg 100 ml IVPB at 200 ml/hr once over 30 mins Volume: 100  kj2 

      ml; Route: IVPB; Rate: 200 ml/hr; Infused Over: 30 mins; Site: right antecubital;           

01:20 Follow up: IV Status: Infusion continued upon transfer                                  kj2 

01:09 Drug: Methocarbamol  mg PO once Route: PO;                                        kj2 

01:20 Follow up: Response: No adverse reaction                                                kj2 

01:09 Drug: Ondansetron PO 4 mg PO once Route: PO;                                            kj2 

01:21 Follow up: Response: No adverse reaction                                                kj2 

                                                                                                  

                                                                                                  

Disposition Summary:                                                                              

12/10/24 23:24                                                                                    

Transfer Ordered                                                                                  

 Notes:       Transfer Location: [facility]                                                        
  sp4

      Reason: Higher level of care                                                            sp4 

      Condition: Stable                                                                       sp4 

      Problem: new                                                                            sp4 

      Symptoms: have improved                                                                 sp4 

      Accepting Physician: [facility] Attending MD (24 01:55)                                 kj2 

      Diagnosis                                                                                   

        - Severe sepsis without septic shock                                                  sp4 

        - Hypoxemia                                                                           sp4 

        - Pyelonephritis acute                                                                sp4 

        - Bilateral ureteral stents in the setting of pyelonephritis,   physical              sp4 

      deconditioning, cervical cancer metastatic to lungs.                                        

      Forms:                                                                                      

        - Medication Reconciliation Form                                                      sp4 

        - SBAR form                                                                           sp4 

Signatures:                                                                                       

Dispatcher [employer]                           EDNilesh Kennedy DO                        DO   ms3                                                  

Tomer Herrera MD MD   sp3                                                  

Rufino Marmolejo MD MD   sp4                                                  

Fay Smith RN                  RN   cm10                                                 

Rowena Egan RN                     RN   kj2                                                  

Kaitlin Bosch RN                       RN   go2                                                  

                                                                                                  

Corrections: (The following items were deleted from the chart)                                    

12/10                                                                                             

16:48 16:47 BASIC METABOLIC PANEL+C.LAB.BRZ ordered. EDMS                                     EDMS

16:48 16:48 CBC+H.LAB.BRZ ordered. EDMS                                                       EDMS

16:48 16:48 MAGNESIUM+C.LAB.BRZ ordered. EDMS                                                 EDMS

16:48 16:48 PROBNP+C.LAB.BRZ ordered. EDMS                                                    EDMS

16:48 16:48 Troponin High Sensitivity+C.LAB.BRZ ordered. EDMS                                 EDMS

16:48 16:48 Chest Single View+RAD.RAD.BRZ ordered. EDMS                                       EDMS

20:48 20:48 Urinalysis W/Microscopic+U.LAB.BRZ ordered. EDMS                                  EDMS

20:48 20:48 Chest Abdomen Pelvis Wo Con+CT.RAD.BRZ ordered. EDMS                              EDMS

21:13 16:48 Chest For PE Angio+CT.RAD.BRZ ordered. EDMS                                       EDMS

21:44 21:44 BLOOD CULTURE*+BA.LAB.BRZ ordered. EDMS                                           EDMS

21:44 21:44 LACTATE+C.LAB.BRZ ordered. EDMS                                                   EDMS

21:44 21:44 C-REACTIVE PROTEIN+C.LAB.BRZ ordered. EDMS                                        EDMS

                                                                                             

01:55 12/10 23:24 [facility] Attending MD sp4                                                       kj2 

                                                                                                  

**************************************************************************************************

## 2024-12-10 NOTE — RAD REPORT
EXAMINATION: ONE VIEW CHEST XR



CLINICAL INDICATION: hypoxia



TECHNIQUE: Frontal chest projection is submitted. Examination is limited by patient positioning and t
echnique.



COMPARISON: 11/22/2024



FINDINGS:



The lungs are well inflated and clear. The heart is upper limit of normal in size. No displaced fract
ures identified.  Right port catheter tip in SVC.



IMPRESSION: 



No acute intrathoracic abnormalities. 







Reported By: Sudeep Alas 

Electronically Signed:  12/10/2024 6:36 PM

## 2024-12-10 NOTE — ER
Nurse's Notes                                                                                     

 [facility]                                                                 

Name: Patricia Mcdonald                                                                           

Age: 79 yrs                                                                                       

Sex: Female                                                                                       

: 1945                                                                                   

MRN: [id number]                                                                                   

Arrival Date: 12/10/2024                                                                          

Time: 16:22                                                                                       

Account#: [id number]                                                                            

Bed 14                                                                                            

Private MD:                                                                                       

Diagnosis: Severe sepsis without septic shock;Hypoxemia;Pyelonephritis acute;Bilateral ureteral   

  stents in the setting of pyelonephritis, physical deconditioning, cervical cancer               

  metastatic to lungs.                                                                            

                                                                                                  

Presentation:                                                                                     

12/10                                                                                             

16:39 Chief complaint: Patient states: SENT TO THE ER BY [facility] FOR LOW OXYGEN AND     cm10

      SHORTNESS OF BREATH. Coronavirus screen: Client denies travel out of the [place] in the        

      last 14 days. Ebola Screen: Patient denies travel to an Ebola-affected area in the 21       

      days before illness onset. No symptoms or risks identified at this time. Initial Sepsis     

      Screen: Does the patient meet any 2 criteria? No. Patient's initial sepsis screen is        

      negative. Does the patient have a suspected source of infection? No. Patient's initial      

      sepsis screen is negative. Risk Assessment: Do you want to hurt yourself or someone         

      else? Patient reports no desire to harm self or others. Onset of symptoms was December      

      10, 2024.                                                                                   

16:39 Method Of Arrival: Wheelchair                                                           cm10

16:39 Acuity: BARRY 3                                                                           cm10

                                                                                                  

Triage Assessment:                                                                                

16:40 General: Appears in no apparent distress. comfortable, Behavior is calm, cooperative.   cm10

      Neuro: No deficits noted. Level of Consciousness is awake, alert, obeys commands,           

      Oriented to person, place, time, situation, Appropriate for age. Respiratory: No            

      deficits noted. Airway is patent Respiratory effort is even, unlabored, Respiratory         

      pattern is regular, symmetrical.                                                            

19:40 Respiratory: Onset: The symptoms/episode began/occurred gradually, the patient has mild go2 

      shortness of breath.                                                                        

                                                                                                  

Historical:                                                                                       

- Allergies:                                                                                      

16:37 No Known Allergies;                                                                     cm10

- PMHx:                                                                                           

16:37 Hypertensive disorder; kidney disease; VAGINAL CANCER; COPD; PULMONARY EMBOLISM;        cm10

- PSHx:                                                                                           

16:37 Hemorrhoidectomy;                                                                       cm10

                                                                                                  

- Immunization history:: Adult Immunizations up to date.                                          

- Infectious Disease History:: Denies.                                                            

- Social history:: Smoking status: Patient denies any tobacco usage or history of.                

                                                                                                  

                                                                                                  

Screenin:38 [facility] ED Fall Risk Assessment (Adult) History of falling in the last 3 months,       go2 

      including since admission No falls in past 3 months (0 pts) Confusion or Disorientation     

      No (0 pts) Intoxicated or Sedated No (0 pts) Impaired Gait Yes (1 pt) Mobility Assist       

      Device Used Yes (1 pt) Altered Elimination No (0 pt) Score/Fall Risk Level 0 - 2 = Low      

      Risk. Abuse screen: Denies threats or abuse. Nutritional screening: No deficits noted.      

      Tuberculosis screening: No symptoms or risk factors identified.                             

                                                                                                  

Assessment:                                                                                       

19:37 General: Appears in no apparent distress. comfortable, Behavior is calm, cooperative,   go2 

      Denies fever, feeling ill, fatigue. Pain: Complains of pain in chest and abdomen. Pain:     

      Quality of pain is described as aching. Neuro: No deficits noted. Cardiovascular: No        

      deficits noted. Rhythm is sinus bradycardia. Respiratory: Reports shortness of breath       

      at rest Airway is patent Breath sounds are clear bilaterally. GI: No deficits noted.        

      : No deficits noted. EENT: No deficits noted. Derm: No deficits noted.                    

      Musculoskeletal: No deficits noted.                                                         

                                                                                             

00:00 Reassessment: Patient appears in no apparent distress at this time. Patient and/or      kj2 

      family updated on plan of care and expected duration. Pain level reassessed. Patient is     

      alert, oriented x 3, equal unlabored respirations, skin warm/dry/pink.                      

01:10 Reassessment: Patient appears in no apparent distress at this time. Patient and/or      kj2 

      family updated on plan of care and expected duration. Pain level reassessed. Patient is     

      alert, oriented x 3, equal unlabored respirations, skin warm/dry/pink.                      

01:23 Reassessment: EMS arrived to transport patient.                                         kj2 

                                                                                                  

Vital Signs:                                                                                      

12/10                                                                                             

16:39  / 84; Pulse 86; Resp 17; Temp 98(TE); Pulse Ox 88% on R/A; Pain 0/10;            cm10

19:36  / 79; Pulse 61; Resp 16; Temp 97.3; Pulse Ox 100% 4 lpm ;                        go2 

21:36  / 61; Pulse 57; Resp 16; Temp 99; Pulse Ox 100% ;                                go2 

                                                                                             

00:00  / 63; Pulse 58; Resp 18; Pulse Ox 98% on 2 lpm NC;                               kj2 

01:16  / 88; Pulse 88; Resp 18; Pulse Ox 98% ;                                          ty  

12/10                                                                                             

16:39 Pain Scale: Adult                                                                       cm10

                                                                                                  

ED Course:                                                                                        

12/10                                                                                             

16:28 Patient arrived in ED.                                                                  ra3 

16:38 Nilesh Momin DO is Attending Physician.                                                ms3 

16:40 Triage completed.                                                                       cm10

16:40 Arm band placed on right wrist. Patient placed in waiting room, on oxygen.              cm10

17:26 Attending Physician role handed off by Nilesh Momin DO                                 sp3 

17:26 Tomer Herrera MD is Attending Physician.                                                sp3 

18:06 Radiology exam delayed due to lab results not completed at this time. (BUN/Creatinine)  nj  

      IV insertion attempt and/or patient not having appropriate IV at this time.                 

18:24 Kaitlin Bosch, RN is Primary Nurse.                                                     go2 

18:27 XRAY Chest (1 view) In Process Unspecified.                                             EDMS

19:17 Radiology exam delayed due to lab results not completed at this time. (BUN/Creatinine)  nj  

      IV insertion attempt and/or patient not having appropriate IV at this time.                 

19:33 Basic Metabolic Panel Sent.                                                             go2 

19:33 CBC with Diff Sent.                                                                     go2 

19:33 Magnesium Sent.                                                                         go2 

19:33 NT PRO-BNP Sent.                                                                        go2 

19:33 Troponin HS Sent.                                                                       go2 

19:39 Patient has correct armband on for positive identification. Bed in low position. Call   go2 

      light in reach. Side rails up X2. Adult w/ patient. Client placed on continuous cardiac     

      and pulse oximetry monitoring. NIBP monitoring applied.                                     

20:19 Attending Physician role handed off by Tomer Herrera MD                                 sp4 

20:19 Rufino Marmolejo MD is Attending Physician.                                           sp4 

21:13 CT Chest Abdomen Pelvis W/O Contrast In Process Unspecified.                            EDMS

22:24 Urinalysis W/Microscopic Sent.                                                          go2 

22:46 Straight cath inserted, using sterile technique, 16 Fr. Patient tolerated well.         go2 

                                                                                             

00:00 Cleaned of incontinence.                                                                kj2 

00:15 Report received from IMELDA Madrigal.                                                          kj2 

01:18 No provider procedures requiring assistance completed. Patient transferred, IV remains  kj2 

      in place.                                                                                   

01:19 Provided Education on: need to transfer.                                                kj2 

                                                                                                  

Administered Medications:                                                                         

12/10                                                                                             

22:47 Drug: Cefepime IVPB 1 grams IVPB at 200 ml/hr once over 30 mins; (mix in  mL)     go2 

      Route: IVPB; Rate: 200 ml/hr; Infused Over: 30 mins; Site: right antecubital;               

22:47 Follow up: IV Status: Completed infusion                                                go2 

22:47 Drug: vancoMYCIN IVPB 1 grams IVPB once over 2 hrs Route: IVPB; Infused Over: 2 hrs;    go2 

      Site: right antecubital;                                                                    

                                                                                             

01:09 Follow up: IV Status: Completed infusion; IV Intake: 250ml                              kj2 

01:00 Drug: Norco PO 10 mg-325 mg 1 tabs PO once Route: PO;                                   kj2 

01:20 Follow up: Response: No adverse reaction                                                kj2 

01:08 Drug: metroNIDAZOLE IVPB 500 mg 100 ml IVPB at 200 ml/hr once over 30 mins Volume: 100  kj2 

      ml; Route: IVPB; Rate: 200 ml/hr; Infused Over: 30 mins; Site: right antecubital;           

01:20 Follow up: IV Status: Infusion continued upon transfer                                  kj2 

01:09 Drug: Methocarbamol  mg PO once Route: PO;                                        kj2 

01:20 Follow up: Response: No adverse reaction                                                kj2 

01:09 Drug: Ondansetron PO 4 mg PO once Route: PO;                                            kj2 

01:21 Follow up: Response: No adverse reaction                                                kj2 

                                                                                                  

                                                                                                  

Medication:                                                                                       

12/10                                                                                             

19:39 VIS not applicable for this client.                                                     go2 

                                                                                                  

Intake:                                                                                           

                                                                                             

01:09 IV: 250ml; Total: 250ml.                                                                kj2 

                                                                                                  

Outcome:                                                                                          

12/10                                                                                             

23:24 ER care complete, transfer ordered by MD.                                               sp4 

                                                                                             

01:19 Transferred by ground EMS to [facility],                        kj2 

      Condition: stable                                                                           

      Discharge instructions given to patient, Instructed on the need for transfer,               

01:55 Patient left the ED.                                                                    kj2 

                                                                                                  

Signatures:                                                                                       

Dispatcher [employer]                           EDMS                                                 

Wiliam Egan Marcus, DO DO   ms3                                                  

Tomer Herrera MD MD   sp3                                                  

Rufino Marmolejo MD MD   sp4                                                  

Fay Smith RN                  RN   cm10                                                 

Karol Nelson ra3                                                  

Esvin Nunez Krystal, RN RN   kj2                                                  

Kaitlin Bosch RN                       RN   go2                                                  

                                                                                                  

**************************************************************************************************

## 2024-12-10 NOTE — RAD REPORT
EXAM: CT CHEST, ABDOMEN AND PELVIS WITHOUT CONTRAST



CLINICAL INDICATION: urinary stents



TECHNIQUE: CT chest, abdomen and pelvis was performed without contrast, as per department protocol. A
xial, sagittal and coronal reconstructions were obtained. One or more of the following dose

reduction techniques were used: Automated exposure control, adjustment of the mA and/or kV according 
to patient size, and/or iterative reconstruction. Unless otherwise specified, incidental findings

do not require dedicated imaging follow-up. 



Examination is limited by the lack of intravenous contrast material.



COMPARISON: 10/24/2024, 8/14/2024



FINDINGS: 



LUNGS: Multiple varying size pulmonary nodules are present throughout both lungs.This is most compati
ble with metastatic disease and appears similar to PET/CT dated 10/24/2024.



PLEURA: No pleural effusion. No pneumothorax. 

 

MEDIASTINUM AND LYMPH NODES: No mediastinal mass or fluid collection. Normal size mediastinal, hilar,
 and axillary lymph nodes.



OSSEOUS STRUCTURES AND CHEST WALL: Intact.



LIVER: A poorly defined 27 mm lesion in the right lobe of the liver and similar 20 mm lesion in the l
eft lower liver compatible with liver metastatic disease. Additional smaller similar lesions are

seen right lobe infiltrate. Grossly unremarkable gallbladder.



PANCREAS: No mass, ductal dilation, or greg-pancreatic fluid.



SPLEEN: Normal size. No focal lesion.



ADRENALS: Normal; no mass.



KIDNEYS: Bilateral double-J stents are in place with moderate right-sided hydronephrosis and hydroure
ter present. Mild left-sided hydronephrosis also present. 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

collecting system on the left and there is moderate hydronephrosis involving the inferior duplicated 
moiety as well.



URINARY BLADDER: Not well assessed due to streak artifact from hardware.



GASTROINTESTINAL TRACT: No bowel obstruction, free air, significant free fluid or abscess.  



APPENDIX: Normal appendix. 



LYMPH NODES: Mild lymphadenopathy seen left iliac chain and left pelvic sidewall



MUSCULOSKELETAL: No acute or suspicious osseous abnormality.



OTHER:



IMPRESSION:  



Bilateral double-J stents are in place as detailed with mild to moderate persistent hydronephrosis bi
laterally. Urinary bladder assessment is limited due to streak artifact.



Metastatic disease noted in the lungs and liver. Left iliac and pelvic sidewall lymphadenopathy also 
present.



Reported By: Sudeep Alas 

Electronically Signed:  12/10/2024 9:22 PM

## 2024-12-13 NOTE — EKG
Test Date:    2024-12-10               Test Time:    19:28:10

Technician:   ELIN                                     

                                                     

MEASUREMENT RESULTS:                                       

Intervals:                                           

Rate:         60                                     

OR:           152                                    

QRSD:         80                                     

QT:           518                                    

QTc:          518                                    

Axis:                                                

P:            45                                     

OR:           152                                    

QRS:          -9                                     

T:            20                                     

                                                     

INTERPRETIVE STATEMENTS:                                       

                                                     

Normal sinus rhythm

Anterior infarct, age undetermined

T wave abnormality, consider lateral ischemia

Prolonged QT

Abnormal ECG

Compared to ECG 08/14/2024 10:20:58

T-wave abnormality now present

Possible ischemia now present

Prolonged QT interval now present

Sinus tachycardia no longer present

Myocardial infarct finding still present



Electronically Signed On 12-13-24 15:50:01 CST by Renard Rivera